# Patient Record
Sex: FEMALE | Race: WHITE
[De-identification: names, ages, dates, MRNs, and addresses within clinical notes are randomized per-mention and may not be internally consistent; named-entity substitution may affect disease eponyms.]

---

## 2017-03-15 ENCOUNTER — HOSPITAL ENCOUNTER (EMERGENCY)
Dept: HOSPITAL 59 - ER | Age: 22
Discharge: HOME | End: 2017-03-15
Payer: MEDICAID

## 2017-03-15 DIAGNOSIS — R11.0: ICD-10-CM

## 2017-03-15 DIAGNOSIS — R10.31: ICD-10-CM

## 2017-03-15 DIAGNOSIS — O26.891: Primary | ICD-10-CM

## 2017-03-15 DIAGNOSIS — Z3A.01: ICD-10-CM

## 2017-03-15 DIAGNOSIS — R19.7: ICD-10-CM

## 2017-03-15 LAB
ALBUMIN SERPL-MCNC: 4.3 GM/DL (ref 3.5–5)
ALP SERPL-CCNC: 56 U/L (ref 38–126)
ALT SERPL-CCNC: 30 U/L (ref 9–52)
ANION GAP SERPL CALC-SCNC: 12.1 MMOL/L (ref 7–16)
APPEARANCE UR: CLEAR
AST SERPL-CCNC: 19 U/L (ref 14–36)
BASOPHILS NFR BLD: 0.3 % (ref 0–6)
BILIRUB DIRECT SERPL-MCNC: 0 MG/DL (ref 0–0.3)
BILIRUB SERPL-MCNC: 0.68 MG/DL (ref 0.2–1.3)
BILIRUB UR-MCNC: NEGATIVE MG/DL
BUN SERPL-MCNC: 8 MG/DL (ref 7–17)
CO2 SERPL-SCNC: 22.9 MMOL/L (ref 22–30)
COLOR UR: YELLOW
CREAT SERPL-MCNC: 0.7 MG/DL (ref 0.52–1.04)
EOSINOPHIL NFR BLD: 0.6 % (ref 0–6)
ERYTHROCYTE [DISTWIDTH] IN BLOOD BY AUTOMATED COUNT: 13.2 % (ref 11.5–14.5)
EST GLOMERULAR FILTRATION RATE: > 60 ML/MIN
GLUCOSE SERPL-MCNC: 92 MG/DL (ref 70–110)
GLUCOSE UR STRIP-MCNC: NEGATIVE MG/DL
GRANULOCYTES NFR BLD: 62.9 % (ref 47–80)
HCG,QUALITATIVE URINE: POSITIVE
HCT VFR BLD CALC: 41.4 % (ref 35–47)
HGB BLD-MCNC: 14.6 GM/DL (ref 11.6–16)
KETONES UR QL STRIP: NEGATIVE
LIPASE SERPL-CCNC: 294 U/L (ref 23–300)
LYMPHOCYTES NFR BLD AUTO: 28.1 % (ref 16–45)
MCH RBC QN AUTO: 29.7 PG (ref 27–33)
MCHC RBC AUTO-ENTMCNC: 35.3 G/DL (ref 32–36)
MCV RBC AUTO: 84.3 FL (ref 81–97)
MONOCYTES NFR BLD: 8.1 % (ref 0–9)
NITRITE UR QL STRIP: NEGATIVE
PLATELET # BLD: 220 K/UL (ref 130–400)
PMV BLD AUTO: 9.8 FL (ref 7.4–10.4)
PROT SERPL-MCNC: 6.8 GM/DL (ref 6.3–8.2)
PROT UR QL STRIP: NEGATIVE
RBC # BLD AUTO: 4.91 M/UL (ref 3.8–5.4)
RBC # UR STRIP: NEGATIVE /UL
URINE LEUKOCYTE ESTERASE: NEGATIVE
UROBILINOGEN UR STRIP-ACNC: 0.2 E.U./DL (ref 0.2–1)
WBC # BLD AUTO: 7.9 K/UL (ref 4.2–12.2)

## 2017-03-15 PROCEDURE — 81003 URINALYSIS AUTO W/O SCOPE: CPT

## 2017-03-15 PROCEDURE — 96360 HYDRATION IV INFUSION INIT: CPT

## 2017-03-15 PROCEDURE — 87210 SMEAR WET MOUNT SALINE/INK: CPT

## 2017-03-15 PROCEDURE — 85025 COMPLETE CBC W/AUTO DIFF WBC: CPT

## 2017-03-15 PROCEDURE — 76801 OB US < 14 WKS SINGLE FETUS: CPT

## 2017-03-15 PROCEDURE — 80076 HEPATIC FUNCTION PANEL: CPT

## 2017-03-15 PROCEDURE — 99284 EMERGENCY DEPT VISIT MOD MDM: CPT

## 2017-03-15 PROCEDURE — 76700 US EXAM ABDOM COMPLETE: CPT

## 2017-03-15 PROCEDURE — 83690 ASSAY OF LIPASE: CPT

## 2017-03-15 PROCEDURE — 96361 HYDRATE IV INFUSION ADD-ON: CPT

## 2017-03-15 PROCEDURE — 84702 CHORIONIC GONADOTROPIN TEST: CPT

## 2017-03-15 PROCEDURE — 76817 TRANSVAGINAL US OBSTETRIC: CPT

## 2017-03-15 PROCEDURE — 80048 BASIC METABOLIC PNL TOTAL CA: CPT

## 2017-03-15 PROCEDURE — 81025 URINE PREGNANCY TEST: CPT

## 2017-03-15 RX ADMIN — SODIUM CHLORIDE ONE ML: 0.9 INJECTION, SOLUTION INTRAVENOUS at 11:34

## 2017-03-15 NOTE — EMERGENCY DEPARTMENT RECORD
History of Present Illness





- General


Chief Complaint: Abdominal Pain


Stated Complaint: ABD PAIN/RIGHT LOWER SIDE


Time Seen by Provider: 03/15/17 11:00


Source: Patient, RN notes reviewed


Mode of Arrival: Ambulatory





- History of Present Illness


Initial Comments: 


3 days ago epigastric abd pain and moved to the right lower quad area yesterday 

and nausea and diarrhea 4 times since  and no vomiting and on depo shots 

and same sexual partner for 5 years and no vaginal discharge.  [facility] sinus surg, 

Tand A 





Onset/Timin


-: Days(s)


Location: RLQ


Radiation: None


Migration to: No migration


Severity: Severe


Quality: Stabbing


Consistency: Intermittent


Improves With: Movement


Worsens With: Movement


Associated Symptoms: Nausea, Other





- Related Data


LMP (females 10-50): Unknown


Patient Pregnant: No


 Home Medications











 Medication  Instructions  Recorded  Confirmed  Last Taken


 


No Home Med [NO HOME MEDS]  03/15/17 03/15/17 Unknown











 Allergies











Allergy/AdvReac Type Severity Reaction Status Date / Time


 


No Known Drug Allergies Allergy   Verified 03/15/17 10:59














Travel Screening





- Travel/Exposure Within Last 30 Days


Have you traveled within the last 30 days?: No





Review of Systems


Reviewed: No additional complaints except as noted below


Constitutional: Reports: As per HPI.  Denies: Chills, Fever, Malaise, Night 

sweats, Weakness, Weight change


Eyes: Reports: As per HPI.  Denies: Eye discharge, Eye pain, Photophobia, 

Vision change


ENT: Reports: As per HPI.  Denies: Congestion, Dental pain, Ear pain, Epistaxis

, Hearing loss, Throat pain


Respiratory: Reports: As per HPI.  Denies: Cough, Dyspnea, Hemoptysis, Stridor, 

Wheezes


Cardiovascular: Reports: As per HPI.  Denies: Arrhythmia, Chest pain, Dyspnea 

on exertion, Edema, Murmurs, Orthopnea, Palpitations, Paroxysmal nocturnal 

dyspnea, Rheumatic Fever, Syncope


Endocrine: Reports: As per HPI.  Denies: Fatigue, Heat or cold intolerance, 

Polydipsia, Polyuria


Gastrointestinal: Reports: As per HPI, Abdominal pain.  Denies: Constipation, 

Diarrhea, Hematemesis, Hematochezia, Melena, Nausea, Vomiting


Genitourinary: Reports: As per HPI.  Denies: Abnormal menses, Discharge, 

Dyspareunia, Dysuria, Frequency, Hematuria, Incontinence, Retention, Urgency


Musculoskeletal: Reports: As per HPI.  Denies: Arthralgia, Back pain, Gout, 

Joint swelling, Myalgia, Neck pain


Skin: Reports: As per HPI.  Denies: Bruising, Change in color, Change in hair/

nails, Lesions, Pruritus, Rash


Neurological: Reports: As per HPI.  Denies: Abnormal gait, Confusion, Headache, 

Numbness, Paresthesias, Seizure, Tingling, Tremors, Vertigo, Weakness


Psychiatric: Reports: As per HPI.  Denies: Anxiety, Auditory hallucinations, 

Depression, Homicidal thoughts, Suicidal thoughts, Visual hallucinations


Hematological/Lymphatic: Reports: As per HPI.  Denies: Anemia, Blood Clots, 

Easy bleeding, Easy bruising, Swollen glands





Past Medical History





- SOCIAL HISTORY


Smoking Status: Current every day smoker


Alcohol Use: None


Drug Use: None





- RESPIRATORY


Hx Respiratory Disorders: Yes


Hx Asthma: Yes





- CARDIOVASCULAR


Hx Cardio Disorders: No





- NEURO


Hx Neuro Disorders: No





- GI


Hx GI Disorders: Yes


Comment:: Hypo    IGG in blood





- 


Hx Genitourinary Disorders: No





- ENDOCRINE


Hx Endocrine Disorders: No





- MUSCULOSKELETAL


Hx Musculoskeletal Disorders: No





- PSYCH


Hx Psych Problems: No





- HEMATOLOGY/ONCOLOGY


Hx Hematology/Oncology Disorders: No





Family Medical History


Any Significant Family History?: No





Physical Exam





- General


General Appearance: Alert, Oriented x3, Cooperative, No acute distress





- Head


Head exam: Normal inspection





- Eye


Eye exam: Normal appearance, PERRL


Pupils: Normal accommodation





- ENT


ENT exam: Normal exam, Mucous membranes moist, Normal external ear exam, Normal 

orophraynx, TM's normal bilaterally


Ear exam: Normal external inspection.  negative: External canal tenderness


Nasal Exam: Normal inspection.  negative: Discharge, Sinus tenderness


Mouth exam: Normal external inspection, Tongue normal


Teeth exam: Normal inspection.  negative: Dental caries


Throat exam: Normal inspection.  negative: Tonsillar erythema, Tonsillar exudate





- Neck


Neck exam: Normal inspection, Full ROM.  negative: Tenderness





- Respiratory


Respiratory exam: Normal lung sounds bilaterally.  negative: Respiratory 

distress





- Cardiovascular


Cardiovascular Exam: Regular rate, Normal rhythm, Normal heart sounds





- GI/Abdominal


GI/Abdominal exam: Soft, Normal bowel sounds, Tenderness (right lower quad and 

right upper quad pain)





- Rectal


Rectal exam: Deferred





- 


 exam: Deferred, Adnexal tenderness (R), cervical motion tenderness (right 

side only), Normal external exam, Normal speculum exam.  negative: Abnormal 

external exam, Adnexal mass (L), Adnexal tenderness (L), Cervical discharge, 

Vaginal bleeding, Vaginal discharge





- Extremities


Extremities exam: Normal inspection, Full ROM, Normal capillary refill.  

negative: Tenderness





- Back


Back exam: Reports: Normal inspection, Full ROM.  Denies: Muscle spasm, Rash 

noted, Tenderness





- Neurological


Neurological exam: Alert, Normal gait, Oriented X3, Reflexes normal





- Psychiatric


Psychiatric exam: Normal affect, Normal mood





- Skin


Skin exam: Dry, Intact, Normal color, Warm





Course





 Vital Signs











  03/15/17





  11:00


 


Temperature 97.9 F


 


Pulse Rate 84


 


Respiratory 20





Rate 


 


Blood Pressure 132/73


 


Pulse Ox 97














Medical Decision Making





- Lab Data


Result diagrams: 


 03/15/17 11:32





 03/15/17 11:32





Disposition


Clinical Impression: 


Pregnancy


Qualifiers:


 Weeks of gestation: less than 8 weeks Qualified Code(s): Z3A.01 - Less than 8 

weeks gestation of pregnancy


Disposition: Home, Self-Care


Condition: (1) Good


Instructions:  Abdominal Pain in Pregnancy  (ED)


Additional Instructions: 


follow up with Dr. Carol Shukla for your pregnancy.


please give Dr. Sherrill Morales phone number





Forms:  Patient Portal Access


Time of Disposition: 14:59

## 2017-03-21 NOTE — ULTRASOUND REPORT
EXAM:  ULTRASOUND OF THE ABDOMEN



HISTORY:  NAUSEA.  



TECHNIQUE:  Complete transabdominal ultrasound of the abdomen was obtained.  



Comparison:  None.  



FINDINGS:  The liver is homogeneous in echotexture without focal lesion.  No 
gallstones or gallbladder wall thickening. The CBD measures 2.9 mm.  The 
visualized pancreas is unremarkable.  The spleen is unremarkable measuring 9 
cm.  The right kidney measures 10 x 5 cm and the left kidney measures 9 x 5 cm.
  No renal calculus, mass, or hydronephrosis.  The visualized abdominal aorta 
and inferior vena cava are unremarkable.  No free fluid.  



IMPRESSION:  

UNREMARKABLE COMPLETE TRANSABDOMINAL ULTRASOUND OF THE ABDOMEN.  



JOB NUMBER:  [id number]
MTDD

## 2017-03-21 NOTE — ULTRASOUND REPORT
EXAM:  FIRST TRIMESTER OB ULTRASOUND 



HISTORY:  PELVIC PAIN. 



TECHNIQUE:  Standard first trimester OB ultrasound was obtained.  



Comparison:  None.  



FINDINGS:  On transabdominal images, the uterus measures 9.1 x 3.5 x 5.5 cm.  
The ovaries are not well seen transabdominally.  There is partial visualization 
of an intrauterine gestational sac.  



On transvaginal imaging, there is an intrauterine gestational sac with yolk sac 
and fetal pole.  Heart tones were obtained at 149 b.p.m.  There is a cyst of 
the right ovary measuring 3.0 x 1.6 x 3.4 cm likely relating to a corpus luteal 
cyst.  The right ovary measures 5.6 x 2.5 x 4.7 cm and the left ovary measures 
1.9 x 1.7 x 1.2 cm.  Doppler and spectral analysis with color flow was 
utilized.  Arterial and venous flow to both ovaries.  No solid adnexal mass.  
No free fluid.  The second echogenic area of the right ovary may relate to a 
complex cyst.  This measures 2.2 cm.  Current ultrasound age is 5 weeks 4 days.
  This is based on a mean crown rump length of 0.31 cm and gestational sac 
diameter of 1.20 cm.  



IMPRESSION:  

SINGLE LIVE INTRAUTERINE GESTATION WITH CURRENT ULTRASOUND AGE OF 5 WEEKS 4 
DAYS.  CONTINUE NONEMERGENT FOLLOW-UP RECOMMENDED.  PROBABLE CORPUS LUTEAL CYST 
OF THE RIGHT OVARY.  



JOB NUMBER:  [id number]
[facility]

## 2017-08-15 ENCOUNTER — HOSPITAL ENCOUNTER (EMERGENCY)
Dept: HOSPITAL 59 - ER | Age: 22
Discharge: ANOTHER HOSPITAL | End: 2017-08-15
Payer: MEDICAID

## 2017-08-15 DIAGNOSIS — R80.9: ICD-10-CM

## 2017-08-15 DIAGNOSIS — Z3A.28: ICD-10-CM

## 2017-08-15 DIAGNOSIS — O14.93: Primary | ICD-10-CM

## 2017-08-15 DIAGNOSIS — R07.89: ICD-10-CM

## 2017-08-15 DIAGNOSIS — K29.00: ICD-10-CM

## 2017-08-15 DIAGNOSIS — R74.8: ICD-10-CM

## 2017-08-15 DIAGNOSIS — J45.901: ICD-10-CM

## 2017-08-15 DIAGNOSIS — F17.210: ICD-10-CM

## 2017-08-15 DIAGNOSIS — R61: ICD-10-CM

## 2017-08-15 DIAGNOSIS — R06.00: ICD-10-CM

## 2017-08-15 LAB
ALBUMIN SERPL-MCNC: 3.1 GM/DL (ref 3.5–5)
ALP SERPL-CCNC: 77 U/L (ref 38–126)
ALT SERPL-CCNC: 43 U/L (ref 9–52)
ANION GAP SERPL CALC-SCNC: 5.6 MMOL/L (ref 7–16)
APPEARANCE UR: CLEAR
AST SERPL-CCNC: 40 U/L (ref 14–36)
BACTERIA #/AREA URNS HPF: (no result) /[HPF]
BASOPHILS NFR BLD: 0.1 % (ref 0–6)
BILIRUB DIRECT SERPL-MCNC: 0 MG/DL (ref 0–0.3)
BILIRUB SERPL-MCNC: 0.89 MG/DL (ref 0.2–1.3)
BILIRUB UR-MCNC: NEGATIVE MG/DL
BUN SERPL-MCNC: 12 MG/DL (ref 7–17)
CK MB SERPL-MCNC: 1.1 UG/L (ref 0–6)
CO2 SERPL-SCNC: 22.4 MMOL/L (ref 22–30)
COLOR UR: YELLOW
CREAT SERPL-MCNC: 0.6 MG/DL (ref 0.52–1.04)
CREATINE PHOSPHOKINASE: 58 U/L (ref 30–135)
EOSINOPHIL NFR BLD: 0.4 % (ref 0–6)
EPI CELLS #/AREA URNS HPF: (no result) /[HPF]
ERYTHROCYTE [DISTWIDTH] IN BLOOD BY AUTOMATED COUNT: 12.8 % (ref 11.5–14.5)
EST GLOMERULAR FILTRATION RATE: > 60 ML/MIN
GLUCOSE SERPL-MCNC: 100 MG/DL (ref 70–110)
GLUCOSE UR STRIP-MCNC: NEGATIVE MG/DL
GRANULOCYTES NFR BLD: 76.9 % (ref 47–80)
HCT VFR BLD CALC: 39.9 % (ref 35–47)
HGB BLD-MCNC: 14.4 GM/DL (ref 11.6–16)
KETONES UR QL STRIP: NEGATIVE
LIPASE SERPL-CCNC: 39 U/L (ref 23–300)
LYMPHOCYTES NFR BLD AUTO: 14.8 % (ref 16–45)
MCH RBC QN AUTO: 30.8 PG (ref 27–33)
MCHC RBC AUTO-ENTMCNC: 36.1 G/DL (ref 32–36)
MCV RBC AUTO: 85.3 FL (ref 81–97)
MONOCYTES NFR BLD: 7.8 % (ref 0–9)
NITRITE UR QL STRIP: NEGATIVE
PLATELET # BLD: 161 K/UL (ref 130–400)
PMV BLD AUTO: 10.6 FL (ref 7.4–10.4)
PROT SERPL-MCNC: 5.7 GM/DL (ref 6.3–8.2)
PROT UR QL STRIP: (no result)
RBC # BLD AUTO: 4.68 M/UL (ref 3.8–5.4)
RBC # UR STRIP: (no result) /UL
RBC #/AREA URNS HPF: (no result) /[HPF]
URINE LEUKOCYTE ESTERASE: NEGATIVE
UROBILINOGEN UR STRIP-ACNC: 0.2 E.U./DL (ref 0.2–1)
WBC # BLD AUTO: 16.5 K/UL (ref 4.2–12.2)
WBC #/AREA URNS HPF: (no result) /[HPF]

## 2017-08-15 PROCEDURE — 85730 THROMBOPLASTIN TIME PARTIAL: CPT

## 2017-08-15 PROCEDURE — 94640 AIRWAY INHALATION TREATMENT: CPT

## 2017-08-15 PROCEDURE — 93005 ELECTROCARDIOGRAM TRACING: CPT

## 2017-08-15 PROCEDURE — 82553 CREATINE MB FRACTION: CPT

## 2017-08-15 PROCEDURE — 82550 ASSAY OF CK (CPK): CPT

## 2017-08-15 PROCEDURE — 96376 TX/PRO/DX INJ SAME DRUG ADON: CPT

## 2017-08-15 PROCEDURE — 85025 COMPLETE CBC W/AUTO DIFF WBC: CPT

## 2017-08-15 PROCEDURE — 81001 URINALYSIS AUTO W/SCOPE: CPT

## 2017-08-15 PROCEDURE — 84702 CHORIONIC GONADOTROPIN TEST: CPT

## 2017-08-15 PROCEDURE — 93010 ELECTROCARDIOGRAM REPORT: CPT

## 2017-08-15 PROCEDURE — 99285 EMERGENCY DEPT VISIT HI MDM: CPT

## 2017-08-15 PROCEDURE — 84550 ASSAY OF BLOOD/URIC ACID: CPT

## 2017-08-15 PROCEDURE — 80048 BASIC METABOLIC PNL TOTAL CA: CPT

## 2017-08-15 PROCEDURE — 80076 HEPATIC FUNCTION PANEL: CPT

## 2017-08-15 PROCEDURE — 96375 TX/PRO/DX INJ NEW DRUG ADDON: CPT

## 2017-08-15 PROCEDURE — 83690 ASSAY OF LIPASE: CPT

## 2017-08-15 PROCEDURE — 96374 THER/PROPH/DIAG INJ IV PUSH: CPT

## 2017-08-15 RX ADMIN — LORAZEPAM ONE MG: 2 INJECTION INTRAMUSCULAR; INTRAVENOUS at 20:22

## 2017-08-15 RX ADMIN — LABETALOL HYDROCHLORIDE ONE MG: 5 INJECTION, SOLUTION INTRAVENOUS at 22:22

## 2017-08-15 RX ADMIN — LABETALOL HYDROCHLORIDE ONE MG: 5 INJECTION, SOLUTION INTRAVENOUS at 21:30

## 2017-08-15 RX ADMIN — ALBUTEROL SULFATE ONE MG: 2.5 SOLUTION RESPIRATORY (INHALATION) at 20:21

## 2017-08-15 RX ADMIN — PHENOBARBITAL ELIXIR ONE ML: 16.2; .1037; .0065; .0194 ELIXIR ORAL at 20:21

## 2017-08-15 RX ADMIN — LABETALOL HYDROCHLORIDE ONE MG: 5 INJECTION, SOLUTION INTRAVENOUS at 22:05

## 2017-08-15 RX ADMIN — ACETAMINOPHEN ONE MG: 325 TABLET, FILM COATED ORAL at 20:22

## 2017-08-15 NOTE — EMERGENCY DEPARTMENT RECORD
History of Present Illness





- General


Chief Complaint: Chest Pain


Stated Complaint: CHEST PAIN/CÉSAR


Time Seen by Provider: 08/15/17 20:03


Source: Patient, RN notes reviewed


Mode of Arrival: Ambulatory





- History of Present Illness


Initial Comments: 


sob and sharp chest pain and worse deep inspiration. 28 weeks preg., fht 140





Onset/Timin


-: Minutes(s)


Pain Location: Substernal, Epigastric


Pain Radiation: None


Severity: Moderate


Quality: Sharp


Consistency: Constant, Getting worse


Improves With: Rest, Other


Worsens With: Other


Context: New medications


Anginal Symptoms: Diaphoresis


Other Symptoms: Leg swelling


Treatments Prior to Arrival: Other





- Related Data


 Home Medications











 Medication  Instructions  Recorded  Confirmed  Last Taken


 


Albuterol Sulfate [Ventolin Hfa] 1 - 2 puff IH .EVERY 4-6 HRS PRN 08/15/17 08/15

/17 08/15/17











 Allergies











Allergy/AdvReac Type Severity Reaction Status Date / Time


 


No Known Drug Allergies Allergy   Verified 03/15/17 10:59














Travel Screening





- Travel/Exposure Within Last 30 Days


Have you traveled within the last 30 days?: No





- Travel Symptoms


Symptom Screening: None





Past Medical History





- SOCIAL HISTORY


Smoking Status: Current every day smoker


Alcohol Use: None


Drug Use: None





- RESPIRATORY


Hx Respiratory Disorders: Yes


Hx Asthma: Yes





- CARDIOVASCULAR


Hx Cardio Disorders: No





- NEURO


Hx Neuro Disorders: No





- GI


Hx GI Disorders: Yes


Comment:: Hypo    IGG in blood





- 


Hx Genitourinary Disorders: No





- ENDOCRINE


Hx Endocrine Disorders: No





- MUSCULOSKELETAL


Hx Musculoskeletal Disorders: No





- PSYCH


Hx Psych Problems: No





- HEMATOLOGY/ONCOLOGY


Hx Hematology/Oncology Disorders: No





Family Medical History


Any Significant Family History?: No


Family Hx Comment (NOT TO BE USED IN PLACE OF ITEMS BELOW): DENIES





Course





 Vital Signs











  08/15/17 08/15/17 08/15/17





  19:47 19:49 19:58


 


Temperature 97.8 F 97.8 F 


 


Pulse Rate [  62 





Pulse Ox Probe]   


 


Respiratory 24 24 





Rate   


 


Blood Pressure  205/138 





[Left Arm]   


 


Blood Pressure   203/119





[Right Arm]   


 


Pulse Ox 98 98 98








Discussed case with Dr. Farmer covering for Dr. Carol Shukla and will transfer 

by ambulance to [facility]





Medical Decision Making





- Data Complexity


MDM Data: Labs Ordered and/or Reviewed (ast 40 ), EKG Ordered and/or Reviewed (

No acute changes)





- Lab Data


Result diagrams: 


 08/15/17 19:55





 08/15/17 19:55





Disposition


Clinical Impression: 


 Chest wall pain, Elevated liver enzymes





Gastritis


Qualifiers:


 Gastritis type: unspecified gastritis Chronicity: acute Gastritis bleeding: 

without bleeding Qualified Code(s): K29.00 - Acute gastritis without bleeding





Asthma


Qualifiers:


 Asthma severity: unspecified severity Asthma complication type: with acute 

exacerbation Qualified Code(s): J45.901 - Unspecified asthma with (acute) 

exacerbation





Pregnancy


Qualifiers:


 Weeks of gestation: 28 weeks Qualified Code(s): Z3A.28 - 28 weeks gestation of 

pregnancy





Hypertension


Qualifiers:


 Hypertension type: unspecified Qualified Code(s): I10 - Essential (primary) 

hypertension





Preeclampsia


Qualifiers:


 Trimester: second trimester Qualified Code(s): O14.92 - Unspecified pre-

eclampsia, second trimester





Proteinuria


Qualifiers:


 Proteinuria type: unspecified Qualified Code(s): R80.9 - Proteinuria, 

unspecified





Disposition: Acute Care Hospital Transfer


Condition: (2) Stable


Forms:  Patient Portal Access


Time of Disposition: 21:31





Quality





- Quality Measures


Quality Measures: N/A





- Blood Pressure Screening


Does Patient Have Any of the Following: No


Blood Pressure Classification: Hypertensive Reading


Systolic Measurement: 168


Diastolic Measurement: 101


Screening for High Blood Pressure: < First Hypertensive BP, F/U Documented > [

]


First Hypertensive Follow-up Interventions: Referral to alternative/primary 

care provider.

## 2017-08-15 NOTE — EMERGENCY DEPARTMENT RECORD
History of Present Illness





- General


Chief Complaint: Chest Pain


Stated Complaint: CHEST PAIN/CÉSAR


Time Seen by Provider: 08/15/17 20:03


Source: Patient, RN notes reviewed


Mode of Arrival: Ambulatory





- History of Present Illness


Initial Comments: 


see previous chart.





Onset/Timin


-: Minutes(s)


Pain Location: Substernal, Epigastric


Pain Radiation: None


Severity: Moderate


Quality: Sharp


Consistency: Constant, Getting worse


Improves With: Rest, Other


Worsens With: Other


Context: New medications


Anginal Symptoms: Diaphoresis


Other Symptoms: Leg swelling


Treatments Prior to Arrival: Other





- Related Data


 Home Medications











 Medication  Instructions  Recorded  Confirmed  Last Taken


 


Albuterol Sulfate [Ventolin Hfa] 1 - 2 puff IH .EVERY 4-6 HRS PRN 08/15/17 08/15

/17 08/15/17











 Allergies











Allergy/AdvReac Type Severity Reaction Status Date / Time


 


No Known Drug Allergies Allergy   Verified 03/15/17 10:59














Travel Screening





- Travel/Exposure Within Last 30 Days


Have you traveled within the last 30 days?: No





- Travel Symptoms


Symptom Screening: None





Review of Systems


Reviewed: No additional complaints except as noted below


Constitutional: Reports: As per HPI.  Denies: Chills, Fever, Malaise, Night 

sweats, Weakness, Weight change


Eyes: Reports: As per HPI.  Denies: Eye discharge, Eye pain, Photophobia, 

Vision change


ENT: Reports: As per HPI.  Denies: Congestion, Dental pain, Ear pain, Epistaxis

, Hearing loss, Throat pain


Respiratory: Reports: As per HPI.  Denies: Cough, Dyspnea, Hemoptysis, Stridor, 

Wheezes


Cardiovascular: Reports: As per HPI, Chest pain.  Denies: Arrhythmia, Dyspnea 

on exertion, Edema, Murmurs, Orthopnea, Palpitations, Paroxysmal nocturnal 

dyspnea, Rheumatic Fever, Syncope


Endocrine: Reports: As per HPI.  Denies: Fatigue, Heat or cold intolerance, 

Polydipsia, Polyuria


Gastrointestinal: Reports: As per HPI, Abdominal pain.  Denies: Constipation, 

Diarrhea, Hematemesis, Hematochezia, Melena, Nausea, Vomiting


Genitourinary: Reports: As per HPI.  Denies: Abnormal menses, Discharge, 

Dyspareunia, Dysuria, Frequency, Hematuria, Incontinence, Retention, Urgency


Musculoskeletal: Reports: As per HPI.  Denies: Arthralgia, Back pain, Gout, 

Joint swelling, Myalgia, Neck pain


Skin: Reports: As per HPI.  Denies: Bruising, Change in color, Change in hair/

nails, Lesions, Pruritus, Rash


Neurological: Reports: As per HPI.  Denies: Abnormal gait, Confusion, Headache, 

Numbness, Paresthesias, Seizure, Tingling, Tremors, Vertigo, Weakness


Psychiatric: Reports: As per HPI.  Denies: Anxiety, Auditory hallucinations, 

Depression, Homicidal thoughts, Suicidal thoughts, Visual hallucinations


Hematological/Lymphatic: Reports: As per HPI.  Denies: Anemia, Blood Clots, 

Easy bleeding, Easy bruising, Swollen glands





Past Medical History





- SOCIAL HISTORY


Smoking Status: Current every day smoker


Alcohol Use: None


Drug Use: None





- RESPIRATORY


Hx Respiratory Disorders: Yes


Hx Asthma: Yes





- CARDIOVASCULAR


Hx Cardio Disorders: No





- NEURO


Hx Neuro Disorders: No





- GI


Hx GI Disorders: Yes


Comment:: Hypo    IGG in blood





- 


Hx Genitourinary Disorders: No





- ENDOCRINE


Hx Endocrine Disorders: No





- MUSCULOSKELETAL


Hx Musculoskeletal Disorders: No





- PSYCH


Hx Psych Problems: No





- HEMATOLOGY/ONCOLOGY


Hx Hematology/Oncology Disorders: No





Family Medical History


Any Significant Family History?: No


Family Hx Comment (NOT TO BE USED IN PLACE OF ITEMS BELOW): DENIES





Physical Exam





- General


General Appearance: Alert, Oriented x3, Cooperative, No acute distress





- Head


Head exam: Normal inspection





- Eye


Eye exam: Normal appearance, PERRL


Pupils: Normal accommodation





- ENT


ENT exam: Normal exam, Mucous membranes moist, Normal external ear exam, Normal 

orophraynx, TM's normal bilaterally


Ear exam: Normal external inspection.  negative: External canal tenderness


Nasal Exam: Normal inspection.  negative: Discharge, Sinus tenderness


Mouth exam: Normal external inspection, Tongue normal


Teeth exam: Normal inspection.  negative: Dental caries


Throat exam: Normal inspection.  negative: Tonsillar erythema, Tonsillar exudate





- Neck


Neck exam: Normal inspection, Full ROM.  negative: Tenderness





- Respiratory


Respiratory exam: Normal lung sounds bilaterally.  negative: Respiratory 

distress





- Cardiovascular


Cardiovascular Exam: Regular rate, Normal rhythm, Normal heart sounds





- GI/Abdominal


GI/Abdominal exam: Soft, Normal bowel sounds.  negative: Tenderness





- Rectal


Rectal exam: Deferred





- 


 exam: Deferred





- Extremities


Extremities exam: Normal inspection, Full ROM, Normal capillary refill.  

negative: Tenderness





- Back


Back exam: Reports: Normal inspection, Full ROM.  Denies: Muscle spasm, Rash 

noted, Tenderness





- Neurological


Neurological exam: Alert, Normal gait, Oriented X3, Reflexes normal





- Psychiatric


Psychiatric exam: Normal affect, Normal mood





- Skin


Skin exam: Dry, Intact, Normal color, Warm





Course





 Vital Signs











  08/15/17 08/15/17 08/15/17





  19:47 19:49 19:58


 


Temperature 97.8 F 97.8 F 


 


Pulse Rate   


 


Pulse Rate [  62 





Pulse Ox Probe]   


 


Respiratory 24 24 





Rate   


 


Blood Pressure  205/138 





[Left Arm]   


 


Blood Pressure   203/119





[Right Arm]   


 


Pulse Ox 98 98 98














  08/15/17 08/15/17 08/15/17





  20:18 20:21 20:47


 


Temperature   97.9 F


 


Pulse Rate  60 


 


Pulse Rate [ 60  61





Pulse Ox Probe]   


 


Respiratory 20 16 18





Rate   


 


Blood Pressure   





[Left Arm]   


 


Blood Pressure 199/116  198/104





[Right Arm]   


 


Pulse Ox 99 100 96














  08/15/17 08/15/17 08/15/17





  21:21 21:27 21:36


 


Temperature   


 


Pulse Rate   


 


Pulse Rate [ 64  74





Pulse Ox Probe]   


 


Respiratory 22 20 20





Rate   


 


Blood Pressure   





[Left Arm]   


 


Blood Pressure 168/101 163/100 165/108





[Right Arm]   


 


Pulse Ox 100 98 99














  08/15/17





  21:40


 


Temperature 


 


Pulse Rate 


 


Pulse Rate [ 62





Pulse Ox Probe] 


 


Respiratory 20





Rate 


 


Blood Pressure 





[Left Arm] 


 


Blood Pressure 183/107





[Right Arm] 


 


Pulse Ox 100








Dr. Ceja called back and wanted her sent to [facility] because at 27.5 weeks





- Reevaluation(s)


Reevaluation #1: 


Discussed case with Dr. Behring At [facility] and she accepted the patient. 


08/15/17 22:17








Medical Decision Making





- Lab Data


Result diagrams: 


 08/15/17 19:55





 08/15/17 19:55





 Lab Results











  08/15/17 08/15/17 08/15/17 Range/Units





  19:55 19:55 19:55 


 


WBC  16.5 H    (4.2-12.2)  K/uL


 


RBC  4.68    (3.80-5.40)  M/uL


 


Hgb  14.4    (11.6-16.0)  gm/dl


 


Hct  39.9    (35.0-47.0)  %


 


MCV  85.3    (81-97)  fl


 


MCH  30.8    (27-33)  pg


 


MCHC  36.1 H    (32-36)  g/dl


 


RDW  12.8    (11.5-14.5)  %


 


Plt Count  161    (130-400)  K/uL


 


MPV  10.6 H    (7.4-10.4)  fl


 


Gran %  76.9    (47-80)  %


 


Lymphocytes %  14.8 L    (16-45)  %


 


Monocytes %  7.8    (0-9)  %


 


Eosinophils %  0.4    (0-6)  %


 


Basophils %  0.1    (0-6)  %


 


APTT   24.70   (24.5-39.1)  SECONDS


 


Sodium    134 L  (136-145)  mmol/L


 


Potassium    3.9  (3.5-5.1)  mmol/L


 


Chloride    106  ()  mmol/L


 


Carbon Dioxide    22.4  (22-30)  mmol/L


 


Anion Gap    5.6 L  (7-16)  


 


BUN    12  (7-17)  mg/dL


 


Creatinine    0.6  (0.52-1.04)  mg/dL


 


Estimated GFR    > 60  ml/min


 


Random Glucose    100  ()  mg/dL


 


Uric Acid     (2.5-6.2)  mg/dL


 


Calcium    8.8  (8.5-10.1)  mg/dL


 


Total Bilirubin     (0.2-1.3)  mg/dL


 


Direct Bilirubin     (0-0.3)  mg/dL


 


AST     (14-36)  U/L


 


ALT     (9-52)  U/L


 


Alkaline Phosphatase     ()  U/L


 


Creatine Kinase    58  ()  U/L


 


CK-MB (CK-2)    1.1  (0-6)  ug/L


 


Total Protein     (6.3-8.2)  gm/dL


 


Albumin     (3.5-5.0)  gm/dL


 


Lipase     ()  U/L


 


Serum HCG, Qual     


 


Total Beta HCG     mIU/mL


 


Urine Color     


 


Urine Appearance     


 


Urine pH     (5.0-8.0)  


 


Ur Specific Gravity     (1.002-1.030)  


 


Urine Protein     (NEGATIVE)  


 


Urine Glucose (UA)     (NEGATIVE)  


 


Urine Ketones     (NEGATIVE)  


 


Urine Blood     (NEGATIVE)  


 


Urine Nitrite     (NEGATIVE)  


 


Urine Bilirubin     (NEGATIVE)  


 


Urine Urobilinogen     (0.20 - 1.00)  E.U./dL


 


Ur Leukocyte Esterase     (NEGATIVE)  


 


Urine RBC     (NONE SEEN)  


 


Urine WBC     (0-2/hpf)  


 


Ur Epithelial Cells     (FEW)  


 


Urine Bacteria     














  08/15/17 08/15/17 08/15/17 Range/Units





  19:55 19:55 20:46 


 


WBC     (4.2-12.2)  K/uL


 


RBC     (3.80-5.40)  M/uL


 


Hgb     (11.6-16.0)  gm/dl


 


Hct     (35.0-47.0)  %


 


MCV     (81-97)  fl


 


MCH     (27-33)  pg


 


MCHC     (32-36)  g/dl


 


RDW     (11.5-14.5)  %


 


Plt Count     (130-400)  K/uL


 


MPV     (7.4-10.4)  fl


 


Gran %     (47-80)  %


 


Lymphocytes %     (16-45)  %


 


Monocytes %     (0-9)  %


 


Eosinophils %     (0-6)  %


 


Basophils %     (0-6)  %


 


APTT     (24.5-39.1)  SECONDS


 


Sodium     (136-145)  mmol/L


 


Potassium     (3.5-5.1)  mmol/L


 


Chloride     ()  mmol/L


 


Carbon Dioxide     (22-30)  mmol/L


 


Anion Gap     (7-16)  


 


BUN     (7-17)  mg/dL


 


Creatinine     (0.52-1.04)  mg/dL


 


Estimated GFR     ml/min


 


Random Glucose     ()  mg/dL


 


Uric Acid   4.09   (2.5-6.2)  mg/dL


 


Calcium     (8.5-10.1)  mg/dL


 


Total Bilirubin  0.89    (0.2-1.3)  mg/dL


 


Direct Bilirubin  0.0    (0-0.3)  mg/dL


 


AST  40 H    (14-36)  U/L


 


ALT  43    (9-52)  U/L


 


Alkaline Phosphatase  77    ()  U/L


 


Creatine Kinase     ()  U/L


 


CK-MB (CK-2)     (0-6)  ug/L


 


Total Protein  5.7 L    (6.3-8.2)  gm/dL


 


Albumin  3.1 L    (3.5-5.0)  gm/dL


 


Lipase  39    ()  U/L


 


Serum HCG, Qual    Cancelled  


 


Total Beta HCG     mIU/mL


 


Urine Color     


 


Urine Appearance     


 


Urine pH     (5.0-8.0)  


 


Ur Specific Gravity     (1.002-1.030)  


 


Urine Protein     (NEGATIVE)  


 


Urine Glucose (UA)     (NEGATIVE)  


 


Urine Ketones     (NEGATIVE)  


 


Urine Blood     (NEGATIVE)  


 


Urine Nitrite     (NEGATIVE)  


 


Urine Bilirubin     (NEGATIVE)  


 


Urine Urobilinogen     (0.20 - 1.00)  E.U./dL


 


Ur Leukocyte Esterase     (NEGATIVE)  


 


Urine RBC     (NONE SEEN)  


 


Urine WBC     (0-2/hpf)  


 


Ur Epithelial Cells     (FEW)  


 


Urine Bacteria     














  08/15/17 08/15/17 Range/Units





  20:47 21:08 


 


WBC    (4.2-12.2)  K/uL


 


RBC    (3.80-5.40)  M/uL


 


Hgb    (11.6-16.0)  gm/dl


 


Hct    (35.0-47.0)  %


 


MCV    (81-97)  fl


 


MCH    (27-33)  pg


 


MCHC    (32-36)  g/dl


 


RDW    (11.5-14.5)  %


 


Plt Count    (130-400)  K/uL


 


MPV    (7.4-10.4)  fl


 


Gran %    (47-80)  %


 


Lymphocytes %    (16-45)  %


 


Monocytes %    (0-9)  %


 


Eosinophils %    (0-6)  %


 


Basophils %    (0-6)  %


 


APTT    (24.5-39.1)  SECONDS


 


Sodium    (136-145)  mmol/L


 


Potassium    (3.5-5.1)  mmol/L


 


Chloride    ()  mmol/L


 


Carbon Dioxide    (22-30)  mmol/L


 


Anion Gap    (7-16)  


 


BUN    (7-17)  mg/dL


 


Creatinine    (0.52-1.04)  mg/dL


 


Estimated GFR    ml/min


 


Random Glucose    ()  mg/dL


 


Uric Acid    (2.5-6.2)  mg/dL


 


Calcium    (8.5-10.1)  mg/dL


 


Total Bilirubin    (0.2-1.3)  mg/dL


 


Direct Bilirubin    (0-0.3)  mg/dL


 


AST    (14-36)  U/L


 


ALT    (9-52)  U/L


 


Alkaline Phosphatase    ()  U/L


 


Creatine Kinase    ()  U/L


 


CK-MB (CK-2)    (0-6)  ug/L


 


Total Protein    (6.3-8.2)  gm/dL


 


Albumin    (3.5-5.0)  gm/dL


 


Lipase    ()  U/L


 


Serum HCG, Qual    


 


Total Beta HCG  77387.00   mIU/mL


 


Urine Color   Yellow  


 


Urine Appearance   Clear  


 


Urine pH   6.5  (5.0-8.0)  


 


Ur Specific Gravity   1.020  (1.002-1.030)  


 


Urine Protein   300 mg/dl H  (NEGATIVE)  


 


Urine Glucose (UA)   Negative  (NEGATIVE)  


 


Urine Ketones   Negative  (NEGATIVE)  


 


Urine Blood   Small H  (NEGATIVE)  


 


Urine Nitrite   Negative  (NEGATIVE)  


 


Urine Bilirubin   Negative  (NEGATIVE)  


 


Urine Urobilinogen   0.2  (0.20 - 1.00)  E.U./dL


 


Ur Leukocyte Esterase   Negative  (NEGATIVE)  


 


Urine RBC   0 - 2  (NONE SEEN)  


 


Urine WBC   0 - 2  (0-2/hpf)  


 


Ur Epithelial Cells   0 - 2  (FEW)  


 


Urine Bacteria   None seen  














Disposition


Clinical Impression: 


 Chest wall pain, Elevated liver enzymes





Gastritis


Qualifiers:


 Gastritis type: unspecified gastritis Chronicity: acute Gastritis bleeding: 

without bleeding Qualified Code(s): K29.00 - Acute gastritis without bleeding





Asthma


Qualifiers:


 Asthma severity: unspecified severity Asthma complication type: with acute 

exacerbation Qualified Code(s): J45.901 - Unspecified asthma with (acute) 

exacerbation





Pregnancy


Qualifiers:


 Weeks of gestation: 28 weeks Qualified Code(s): Z3A.28 - 28 weeks gestation of 

pregnancy





Hypertension


Qualifiers:


 Hypertension type: unspecified Qualified Code(s): I10 - Essential (primary) 

hypertension





Preeclampsia


Qualifiers:


 Trimester: second trimester Qualified Code(s): O14.92 - Unspecified pre-

eclampsia, second trimester





Proteinuria


Qualifiers:


 Proteinuria type: unspecified Qualified Code(s): R80.9 - Proteinuria, 

unspecified





Disposition: Acute Care Hospital Transfer


Condition: (2) Stable


Forms:  Patient Portal Access


Time of Disposition: 22:26





Quality





- Quality Measures


Quality Measures: N/A





- Blood Pressure Screening


Does Patient Have Any of the Following: No


Blood Pressure Classification: Hypertensive Reading


Systolic Measurement: 172


Diastolic Measurement: 103


Screening for High Blood Pressure: < First Hypertensive BP, F/U Documented > [

]


First Hypertensive Follow-up Interventions: Referral to alternative/primary 

care provider.

## 2018-08-05 ENCOUNTER — HOSPITAL ENCOUNTER (EMERGENCY)
Dept: HOSPITAL 59 - ER | Age: 23
Discharge: HOME | End: 2018-08-05
Payer: MEDICAID

## 2018-08-05 DIAGNOSIS — K04.7: Primary | ICD-10-CM

## 2018-08-05 DIAGNOSIS — F17.210: ICD-10-CM

## 2018-08-05 PROCEDURE — 99282 EMERGENCY DEPT VISIT SF MDM: CPT

## 2018-08-05 NOTE — EMERGENCY DEPARTMENT RECORD
History of Present Illness





- General


Stated complaint: TOOTH PAIN


Time Seen by Provider: 18 19:36


Source: Patient


Mode of Arrival: Ambulatory


Limitations: No limitations





- History of Present Illness


Initial comments: 





23 yo female presents to ED for evaluation of left lower dental pain and mild 

STS that began 2 days ago.  Patient reports that she had her wisdom teeth 

removed 2-3 weeks ago which relieved her previous dental pain symptoms, but she 

is now concerned about recurrent dental infection.  Patient denies fevers, 

chills, or difficulty with swallowing.  Patient did call her dentist 2 days ago 

but was unable to get an appointment.  Patient denies health problems at her 

baseline.


MD complaint: Tooth pain


Onset/Timin


-: Days(s)


Location: Tooth #





  __________________________














  __________________________





 1 - Dental pain





Severity: Moderate


Quality: Aching


Consistency: Constant


Improves with: None


Worsens with: None


Context-Epistaxis: Recent surgery/procedure





- Related Data


 Previous Rx's











 Medication  Instructions  Recorded


 


Clindamycin HCl 300 mg PO QID #26 capsule 18











 Allergies











Allergy/AdvReac Type Severity Reaction Status Date / Time


 


No Known Drug Allergies Allergy   Verified 03/15/17 10:59














Review of Systems


Constitutional: Denies: Chills, Fever, Malaise, Night sweats


Eyes: Denies: Eye discharge, Eye pain


ENT: Reports: Dental pain.  Denies: Congestion, Ear pain


Respiratory: Denies: Cough, Dyspnea


Cardiovascular: Denies: Chest pain, Dyspnea on exertion


Endocrine: Denies: Fatigue, Heat or cold intolerance


Gastrointestinal: Denies: Abdominal pain, Nausea, Vomiting


Genitourinary: Denies: Incontinence, Retention


Musculoskeletal: Denies: Arthralgia, Back pain


Skin: Denies: Bruising, Change in color


Neurological: Denies: Abnormal gait, Confusion, Headache


Psychiatric: Denies: Anxiety


Hematological/Lymphatic: Denies: Anemia, Blood Clots





Past Medical History





- SOCIAL HISTORY


Smoking Status: Current every day smoker


Drug Use: None





- RESPIRATORY


Hx Respiratory Disorders: Yes


Hx Asthma: Yes





- CARDIOVASCULAR


Hx Cardio Disorders: No





- NEURO


Hx Neuro Disorders: No





- GI


Hx GI Disorders: Yes


Comment:: Hypo    IGG in blood





- 


Hx Genitourinary Disorders: No





- ENDOCRINE


Hx Endocrine Disorders: No





- MUSCULOSKELETAL


Hx Musculoskeletal Disorders: No





- PSYCH


Hx Psych Problems: No





- HEMATOLOGY/ONCOLOGY


Hx Hematology/Oncology Disorders: No





Family Medical History


Family Hx Comment (NOT TO BE USED IN PLACE OF ITEMS BELOW): DENIES





Physical Exam





- General


General Appearance: Alert, Oriented x3, Cooperative, Mild distress


Limitations: No limitations





- Head


Head exam: Atraumatic, Normocephalic, Normal inspection


Head exam detail: Other (Very mild STS to the left lower mandible laterally, no 

induration, erythema, or fluctuance is present on examination.).  negative: 

Abrasion, Contusion, De Guzman's sign, General tenderness, Hematoma, Laceration





- Eye


Eye exam: Normal appearance.  negative: Conjunctival injection, Periorbital 

swelling, Periorbital tenderness, Scleral icterus





- ENT


Ear exam: negative: Auricular hematoma, Auricular trauma


Nasal Exam: negative: Active bleeding, Discharge, Dried blood, Foreign body


Mouth exam: negative: Drooling, Laceration, Muffled voice, Tongue elevation


Teeth exam: Dental tenderness #, Gingival enlargement.  negative: Fractured 

tooth #


Throat exam: negative: Tonsillar erythema, Tonsillomegaly, R peritonsillar mass

, L peritonsillar mass


Image of Mouth/Teeth: 


  __________________________














  __________________________





 1 - Very mild geingival enlargement, not fluctuant however, and is firm to 

palpation.  No clear gingival abscess is present on examination.








- Neck


Neck exam: Normal inspection.  negative: Tenderness, Thyromegaly





- Respiratory


Respiratory exam: Normal lung sounds bilaterally.  negative: Respiratory 

distress, Rhonchi, Stridor, Wheezes





- Cardiovascular


Cardiovascular Exam: Regular rate, Normal rhythm, Normal heart sounds





- GI/Abdominal


GI/Abdominal exam: Soft.  negative: Rebound, Rigid, Tenderness





- Rectal


Rectal exam: Deferred





- 


 exam: Deferred





- Extremities


Extremities exam: Normal inspection.  negative: Calf tenderness, Pedal edema, 

Tenderness





- Back


Back exam: Denies: CVA tenderness (R), CVA tenderness (L)





- Neurological


Neurological exam: Alert, Normal gait, Oriented X3





- Psychiatric


Psychiatric exam: Normal affect, Normal mood





- Skin


Skin exam: Normal color.  negative: Abrasion


Type of lesion: negative: abrasion





Course





 Vital Signs











  18





  19:29


 


Temperature 99 F


 


Pulse Rate [ 92 H





Pulse Ox Probe] 


 


Respiratory 24





Rate 


 


Blood Pressure 125/83





[Left Arm] 


 


Pulse Ox 98














- Reevaluation(s)


Reevaluation #1: 





18 19:45


Examination appaers c/w possible recurrent dental abscess following recent 

extraction.


I did discuss treatment options including I & D of the gingival tissue, patient 

declined at this time as no clear abscess is visualized on examination.


Will initiate treatment with Clindamycin with instructions to call her dentist 

in the morning for an appointment in 1-3 days as directed.





Disposition


Disposition: Discharge


Clinical Impression: 


 Dental abscess





Disposition: Home, Self-Care


Condition: (2) Stable


Instructions:  Dental Abscess (ED)


Additional Instructions: 


Return to ED if your symptoms worsen or if you have any concerns.


Clindamycin as directed.


Follow-up with your dentist in 1-3 days as directed.


Prescriptions: 


Clindamycin HCl 300 mg PO QID #26 capsule


Time of Disposition: 19:38





Quality





- Quality Measures


Quality Measures: N/A





- Blood Pressure Screening


Does Patient Have Any of the Following: No


Blood Pressure Classification: Pre-Hypertensive BP Reading


Systolic Measurement: 125


Diastolic Measurement: 83


Screening for High Blood Pressure: < Pre-Hypertensive BP, F/U Documented > [

]


Pre-Hypertensive Follow-up Interventions: Referral to alternative/primary care 

provider.

## 2018-12-18 ENCOUNTER — HOSPITAL ENCOUNTER (EMERGENCY)
Dept: HOSPITAL 59 - ER | Age: 23
Discharge: HOME | End: 2018-12-18
Payer: MEDICAID
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
## 2018-12-18 DIAGNOSIS — S60.221A: ICD-10-CM

## 2018-12-18 DIAGNOSIS — S50.11XA: Primary | ICD-10-CM

## 2018-12-18 DIAGNOSIS — F17.210: ICD-10-CM

## 2018-12-18 PROCEDURE — 99283 EMERGENCY DEPT VISIT LOW MDM: CPT

## 2018-12-18 PROCEDURE — 99284 EMERGENCY DEPT VISIT MOD MDM: CPT
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
## 2018-12-20 NOTE — RADIOLOGY REPORT
EXAM:  RIGHT HUMERUS



HISTORY:  INJURY.   



TECHNIQUE:  Two views of the right humerus were performed.  



FINDINGS:  No evidence of fracture or dislocation.  No lytic or blastic lesion.
  



IMPRESSION:  

NEGATIVE RIGHT HUMERUS EXAMINATION.  



JOB NUMBER:  [id number]
[facility]

## 2018-12-20 NOTE — RADIOLOGY REPORT
EXAM:  RIGHT FOREARM



HISTORY:  PAIN.  



TECHNIQUE:  Two views of the right forearm were performed.  



FINDINGS:  No evidence of fracture or dislocation.  No lytic or blastic lesion.
  



IMPRESSION:  

NEGATIVE RIGHT FOREARM EXAMINATION.  



JOB NUMBER:  [id number]
[facility]

## 2018-12-20 NOTE — RADIOLOGY REPORT
EXAM:  RIGHT HAND



HISTORY:  PAIN.  



TECHNIQUE:  Three views of the right hand were performed.  



FINDINGS:  No evidence of fracture or dislocation.  No lytic or blastic lesion.
  



IMPRESSION:  

NEGATIVE RIGHT HAND EXAMINATION.  



JOB NUMBER:  [id number]
[facility]

## 2020-01-04 ENCOUNTER — HOSPITAL ENCOUNTER (EMERGENCY)
Dept: HOSPITAL 59 - ER | Age: 25
Discharge: HOME | End: 2020-01-04
Payer: MEDICAID

## 2020-01-04 DIAGNOSIS — F17.210: ICD-10-CM

## 2020-01-04 DIAGNOSIS — J20.9: ICD-10-CM

## 2020-01-04 DIAGNOSIS — J45.41: Primary | ICD-10-CM

## 2020-01-04 PROCEDURE — 94640 AIRWAY INHALATION TREATMENT: CPT

## 2020-01-04 PROCEDURE — 99283 EMERGENCY DEPT VISIT LOW MDM: CPT

## 2020-01-04 NOTE — EMERGENCY DEPARTMENT RECORD
History of Present Illness





- General


Chief Complaint: Cough


Stated Complaint: COUGH


Time Seen by Provider: 01/04/20 02:41


Source: Patient


Mode of Arrival: Ambulatory


Limitations: No limitations





- History of Present Illness


Initial Comments: 





Pt to the ED with 9 days of illness which started as nasal congestion and cough.

 Seen and treated thru and Urgent Care with Amox and not improved.  Pt is one 

half pack per day smoker with cough with white to yellow sputum.  She has used 

OTC antihistamine and other meds without relief.  No fever recorded.  Hx of 

Asthma but does not have an inhaler at this time.  


-: Week(s)





- Related Data


                                  Previous Rx's











 Medication  Instructions  Recorded


 


Albuterol Sulfate [Albuterol 18 gm IH Q4HR 7 Days #1 hfa.aer.ad 01/04/20





Sulfate Hfa]  


 


Azithromycin [Zithromax] 250 mg PO DAILY 5 Days #6 tablet 01/04/20


 


Fluticasone Propionate [Flonase] 1 spray NA DAILY 7 Days #1 btl 01/04/20


 


Prednisone [Prednisone 20Mg] 40 mg PO DAILY 5 Days #10 tab 01/04/20











                                    Allergies











Allergy/AdvReac Type Severity Reaction Status Date / Time


 


No Known Drug Allergies Allergy   Unverified 03/18/19 14:32














Travel Screening





- Travel/Exposure Within Last 30 Days


Have you traveled within the last 30 days?: No





- Travel Symptoms


Symptom Screening: None





Review of Systems


Constitutional: Denies: Chills, Fever


Eyes: Denies: Eye discharge, Photophobia


ENT: Reports: As per HPI, Congestion.  Denies: Ear pain, Throat pain


Respiratory: Reports: As per HPI, Cough, Wheezes.  Denies: Hemoptysis, Stridor


Cardiovascular: Denies: Arrhythmia, Palpitations, Syncope


Endocrine: Reports: Fatigue


Gastrointestinal: Denies: Abdominal pain, Diarrhea, Nausea, Vomiting


Musculoskeletal: Denies: Back pain


Skin: Denies: Bruising, Rash


Neurological: Denies: Headache, Tingling


Psychiatric: Denies: Anxiety


Hematological/Lymphatic: Denies: Anemia





Past Medical History





- SOCIAL HISTORY


Smoking Status: Current every day smoker


Alcohol Use: Rare, Occasional


Drug Use: Occasional


Drug Use Detail:: Marijuana





- RESPIRATORY


Hx Respiratory Disorders: Yes


Hx Asthma: Yes





- CARDIOVASCULAR


Hx Cardio Disorders: No





- NEURO


Hx Neuro Disorders: No





- GI


Hx GI Disorders: Yes


Comment:: Hypo    IGG in blood





- 


Hx Genitourinary Disorders: No





- ENDOCRINE


Hx Endocrine Disorders: No





- MUSCULOSKELETAL


Hx Musculoskeletal Disorders: No





- PSYCH


Hx Psych Problems: No





- HEMATOLOGY/ONCOLOGY


Hx Hematology/Oncology Disorders: No





Family Medical History


Any Significant Family History?: No


Family Hx Comment (NOT TO BE USED IN PLACE OF ITEMS BELOW): DENIES





Physical Exam





- General


General Appearance: Alert, Oriented x3, Cooperative, Mild distress





- Eye


Eye exam: Normal appearance, PERRL





- ENT


ENT exam: Mucous membranes moist, TM's normal bilaterally


Ear exam: Normal external inspection


Nasal Exam: Discharge


Mouth exam: Normal external inspection


Teeth exam: Normal inspection


Throat exam: Normal inspection





- Neck


Neck exam: Normal inspection, Full ROM.  negative: Lymphadenopathy





- Respiratory


Respiratory exam: Decreased breath sounds, Prolonged expiratory, Rhonchi, 

Wheezes





- Cardiovascular


Cardiovascular Exam: Regular rate, Normal rhythm.  negative: Tachycardia


Peripheral Pulses: 2+: Radial (R), Radial (L)





- GI/Abdominal


GI/Abdominal exam: Soft, Normal bowel sounds.  negative: Guarding, Tenderness





- Extremities


Extremities exam: Normal inspection.  negative: Tenderness





- Back


Back exam: Reports: Normal inspection





- Neurological


Neurological exam: Alert, Normal gait, Oriented X3





- Psychiatric


Psychiatric exam: Normal affect, Normal mood.  negative: Anxious





- Skin


Skin exam: Normal color.  negative: Rash





Course





                                   Vital Signs











  01/04/20





  02:22


 


Temperature 98.6 F


 


Pulse Rate [ 91 H





Pulse Ox Probe] 


 


Respiratory 24





Rate 


 


Blood Pressure 137/90





[Left Arm] 


 


Pulse Ox 97














- Reevaluation(s)


Reevaluation #1: 





01/04/20 02:46


wendy nd exam....Smoker with cough for 9 days and wheeze.  Hx Asthma.  Will 

treat with neb in ED and meds as outlined for home. 


Reevaluation #2: 





01/04/20 03:00


Much improved after treatment with albuterol.  Resp easier and wheeze deminished

.  Increased exchange.  home with meds. 





Disposition


Disposition: Discharge


Clinical Impression: 


 Bronchitis





Reactive airway disease


Qualifiers:


 Asthma severity: moderate Asthma persistence: persistent Asthma complication 

type: with acute exacerbation Qualified Code(s): J45.41 - Moderate persistent 

asthma with (acute) exacerbation





Disposition: Home, Self-Care


Condition: (2) Stable


Instructions:  Acute Bronchitis (ED), How to Use a Nebulizer (ED), Bronchospasm 

(ED)


Additional Instructions: 


DO NOT smoke


Increase fluids


See your doctor in 3-5 days


Return to the ED as needed


Take meds as instructed


Prescriptions: 


Albuterol Sulfate [Albuterol Sulfate Hfa] 18 gm IH Q4HR 7 Days #1 hfa.aer.ad


Fluticasone Propionate [Flonase] 1 spray NA DAILY 7 Days #1 btl


Prednisone [Prednisone 20Mg] 40 mg PO DAILY 5 Days #10 tab


Azithromycin [Zithromax] 250 mg PO DAILY 5 Days #6 tablet


Forms:  Patient Portal Access





Quality





- Quality Measures


Quality Measures: Adult Bronchitis (18-64yr)





- Adult Bronchitis


Quality Measure: Measure #116: Avoidance of ABX w/Adult Bronchitis


ICD10 Codes Entered: Yes


Is patient being admitted: No


Avoidance of ABX w/Bronchitis: ABX prescribed or dispensed


Medical Reason For Rx: Bacterial infection





- Blood Pressure Screening


Does Patient Have Any of the Following: No


Blood Pressure Classification: Hypertensive Reading


Systolic Measurement: 137


Diastolic Measurement: 90


Screening for High Blood Pressure: < Pre-Hypertensive BP, F/U Documented > 

[]


Pre-Hypertensive Follow-up Interventions: Follow-up with rescreen every year.